# Patient Record
Sex: MALE | Race: BLACK OR AFRICAN AMERICAN | URBAN - METROPOLITAN AREA
[De-identification: names, ages, dates, MRNs, and addresses within clinical notes are randomized per-mention and may not be internally consistent; named-entity substitution may affect disease eponyms.]

---

## 2018-08-17 ENCOUNTER — APPOINTMENT (RX ONLY)
Dept: URBAN - METROPOLITAN AREA CLINIC 71 | Facility: CLINIC | Age: 33
Setting detail: DERMATOLOGY
End: 2018-08-17

## 2018-08-17 DIAGNOSIS — L73.2 HIDRADENITIS SUPPURATIVA: ICD-10-CM

## 2018-08-17 PROCEDURE — ? PRESCRIPTION

## 2018-08-17 PROCEDURE — 99201: CPT

## 2018-08-17 PROCEDURE — ? ADDITIONAL NOTES

## 2018-08-17 PROCEDURE — ? COUNSELING

## 2018-08-17 PROCEDURE — ? TREATMENT REGIMEN

## 2018-08-17 RX ORDER — DOXYCYCLINE 100 MG/1
CAPSULE ORAL
Qty: 60 | Refills: 6 | Status: ERX | COMMUNITY
Start: 2018-08-17

## 2018-08-17 RX ADMIN — DOXYCYCLINE: 100 CAPSULE ORAL at 18:37

## 2018-08-17 ASSESSMENT — HURLEY STAGE
IN YOUR EXPERIENCE, AMONG ALL PATIENTS YOU HAVE SEEN WITH THIS CONDITION, HOW SEVERE IS THIS PATIENT'S CONDITION?: HURLEY STAGE II: SINGLE OR MULTIPLE, WIDELY SEPARATED RECURRENT ABSCESSES WITH SINUS TRACT FORMATION AND SCARRING

## 2018-08-17 NOTE — PROCEDURE: TREATMENT REGIMEN
Plan: Discussed treatment options to treat this inflammation. If oral antibiotics fail to control symptoms Humira would be the next option if needed. Also discussed benign destruction of abscesses  if needed
Initiate Treatment: Doxycycline bid
Detail Level: Zone

## 2018-08-17 NOTE — PROCEDURE: ADDITIONAL NOTES
Additional Notes: Per patient: history of numerous recurring cysts in the axillae and groin since childhood

## 2018-08-17 NOTE — PROCEDURE: COUNSELING
Patient Specific Counseling (Will Not Stick From Patient To Patient): Discussed photosensitivity, pot GI effects, and slight potential for HA/rash (would d/c immediately) with Doxy.
Detail Level: Detailed

## 2018-08-17 NOTE — HPI: CYST
How Severe Is Your Cyst?: moderate
Is This A New Presentation, Or A Follow-Up?: Cyst
Additional History: Patient states he has a history of recurrent cysts since childhood. He has been treated with antibiotics for these in the past.

## 2018-10-19 ENCOUNTER — APPOINTMENT (RX ONLY)
Dept: URBAN - METROPOLITAN AREA CLINIC 71 | Facility: CLINIC | Age: 33
Setting detail: DERMATOLOGY
End: 2018-10-19

## 2018-10-19 DIAGNOSIS — L30.8 OTHER SPECIFIED DERMATITIS: ICD-10-CM

## 2018-10-19 DIAGNOSIS — L73.2 HIDRADENITIS SUPPURATIVA: ICD-10-CM

## 2018-10-19 PROCEDURE — ? PRESCRIPTION

## 2018-10-19 PROCEDURE — ? COUNSELING

## 2018-10-19 PROCEDURE — ? TREATMENT REGIMEN

## 2018-10-19 PROCEDURE — 99213 OFFICE O/P EST LOW 20 MIN: CPT

## 2018-10-19 RX ORDER — DOXYCYCLINE 100 MG/1
CAPSULE ORAL
Qty: 60 | Refills: 6 | Status: ERX

## 2018-10-19 ASSESSMENT — LOCATION SIMPLE DESCRIPTION DERM
LOCATION SIMPLE: RIGHT AXILLARY VAULT
LOCATION SIMPLE: LEFT HAND
LOCATION SIMPLE: RIGHT HAND
LOCATION SIMPLE: LEFT AXILLARY VAULT

## 2018-10-19 ASSESSMENT — LOCATION DETAILED DESCRIPTION DERM
LOCATION DETAILED: LEFT RADIAL PALM
LOCATION DETAILED: RIGHT AXILLARY VAULT
LOCATION DETAILED: RIGHT RADIAL PALM
LOCATION DETAILED: LEFT AXILLARY VAULT

## 2018-10-19 ASSESSMENT — LOCATION ZONE DERM
LOCATION ZONE: HAND
LOCATION ZONE: AXILLAE

## 2018-10-19 NOTE — PROCEDURE: COUNSELING
Detail Level: Zone
Patient Specific Counseling (Will Not Stick From Patient To Patient): I believe he was given a sample.

## 2020-01-21 ENCOUNTER — APPOINTMENT (RX ONLY)
Dept: URBAN - METROPOLITAN AREA CLINIC 71 | Facility: CLINIC | Age: 35
Setting detail: DERMATOLOGY
End: 2020-01-21

## 2020-01-21 DIAGNOSIS — L73.2 HIDRADENITIS SUPPURATIVA: ICD-10-CM | Status: INADEQUATELY CONTROLLED

## 2020-01-21 PROCEDURE — ? ADDITIONAL NOTES

## 2020-01-21 PROCEDURE — ? PRESCRIPTION

## 2020-01-21 PROCEDURE — ? COUNSELING

## 2020-01-21 PROCEDURE — 99213 OFFICE O/P EST LOW 20 MIN: CPT

## 2020-01-21 RX ORDER — CLINDAMYCIN PHOSPHATE 10 MG/ML
SOLUTION TOPICAL
Qty: 1 | Refills: 6 | Status: ERX | COMMUNITY
Start: 2020-01-21

## 2020-01-21 RX ADMIN — CLINDAMYCIN PHOSPHATE: 10 SOLUTION TOPICAL at 00:00

## 2020-01-21 ASSESSMENT — LOCATION DETAILED DESCRIPTION DERM
LOCATION DETAILED: RIGHT AXILLARY VAULT
LOCATION DETAILED: LEFT AXILLARY VAULT

## 2020-01-21 ASSESSMENT — LOCATION ZONE DERM: LOCATION ZONE: AXILLAE

## 2020-01-21 ASSESSMENT — LOCATION SIMPLE DESCRIPTION DERM
LOCATION SIMPLE: LEFT AXILLARY VAULT
LOCATION SIMPLE: RIGHT AXILLARY VAULT

## 2020-01-21 NOTE — PROCEDURE: ADDITIONAL NOTES
Detail Level: Generalized
Additional Notes: Patient states this condition does not significantly effect his life, and biologic treatments are not needed or wanted at this time. Patient would prefer to be managed on topical therapies as well as limited oral antibiotics, only as needed. Advised that he should be washing twice daily with an antibacterial soap such as Hibiclens or Dial. Patient was given HS handout on foods to avoid, as well as a Humira pamphlet for home research.